# Patient Record
Sex: FEMALE | Race: WHITE | NOT HISPANIC OR LATINO | Employment: FULL TIME | URBAN - METROPOLITAN AREA
[De-identification: names, ages, dates, MRNs, and addresses within clinical notes are randomized per-mention and may not be internally consistent; named-entity substitution may affect disease eponyms.]

---

## 2023-05-12 ENCOUNTER — OFFICE VISIT (OUTPATIENT)
Age: 38
End: 2023-05-12

## 2023-05-12 VITALS
HEIGHT: 69 IN | WEIGHT: 186.29 LBS | TEMPERATURE: 98.2 F | RESPIRATION RATE: 16 BRPM | HEART RATE: 94 BPM | BODY MASS INDEX: 27.59 KG/M2 | DIASTOLIC BLOOD PRESSURE: 76 MMHG | OXYGEN SATURATION: 100 % | SYSTOLIC BLOOD PRESSURE: 142 MMHG

## 2023-05-12 DIAGNOSIS — H57.11 EYE PAIN, RIGHT: Primary | ICD-10-CM

## 2023-05-12 RX ORDER — SERTRALINE HYDROCHLORIDE 25 MG/1
TABLET, FILM COATED ORAL
COMMUNITY

## 2023-05-12 NOTE — PROGRESS NOTES
[facility]        NAME: Thee Martin is a 40 y o  female  : 1985    MRN: [id number]  DATE: May 12, 2023  TIME: 1:17 PM    Assessment and Plan   Eye pain, right [H57 11]  1  Eye pain, right              Patient Instructions     From your exam:  Your conjunctiva was not red  You did not have drainage when you woke up this morning  I did not see indication of Stye or Hordeolum from your lids  The eye exam with dye did not show corneal abrasion nor foreign body  Use Artificial tear drops OTC  Follow up with opthalmology as needed  Chief Complaint     Chief Complaint   Patient presents with   • Eye Pain     Last night - patient noticed redness, scratching feeling, and itchiness in the R eye  States she feels like she has something in it  States that the eyeball itself does not hurt but that the eyelid does  Denies specific injury  History of Present Illness       Right eye pain starting last night  Did not wake up with eye redness nor drainage  Does not remember getting any foreign body to the eye  No reported injury  States eye feels scratchy  Review of Systems   Review of Systems   HENT: Negative for congestion, sinus pressure and sinus pain  Eyes: Positive for pain  Negative for discharge, redness and itching  Neurological: Negative for dizziness, weakness, light-headedness and headaches           Current Medications       Current Outpatient Medications:   •  Cholecalciferol 10 MCG (400 UNIT) Ilya PRADO, Disp: , Rfl:   •  Multiple Vitamins-Minerals (MULTI ADULT GUMMIES PO), Take 1 tablet by mouth daily, Disp: , Rfl:   •  sertraline (ZOLOFT) 25 mg tablet, , Disp: , Rfl:     Current Allergies     Allergies as of 2023 - Reviewed 2023   Allergen Reaction Noted   • Penicillins Hives, Itching, Shortness Of Breath, and Other (See Comments) 09/10/2011            The following portions of the patient's history were reviewed and updated as appropriate: allergies, current "medications, past family history, past medical history, past social history, past surgical history and problem list      Past Medical History:   Diagnosis Date   • Anxiety    • Gestational diabetes        Past Surgical History:   Procedure Laterality Date   • WISDOM TOOTH EXTRACTION Bilateral        History reviewed  No pertinent family history  Medications have been verified  Objective   /76 (BP Location: Left arm, Patient Position: Sitting, Cuff Size: Standard)   Pulse 94   Temp 98 2 °F (36 8 °C) (Tympanic)   Resp 16   Ht 5' 8 75\" (1 746 m)   Wt 84 5 kg (186 lb 4 6 oz)   LMP 04/18/2023   SpO2 100%   BMI 27 71 kg/m²   Patient's last menstrual period was 04/18/2023  Physical Exam     Physical Exam  Vitals and nursing note reviewed  Eyes:      General: Lids are normal  Lids are everted, no foreign bodies appreciated  Vision grossly intact  Gaze aligned appropriately  No allergic shiner, visual field deficit or scleral icterus  Right eye: No foreign body, discharge or hordeolum  Left eye: No foreign body, discharge or hordeolum  Extraocular Movements: Extraocular movements intact  Right eye: No nystagmus  Left eye: No nystagmus  Conjunctiva/sclera: Conjunctivae normal       Right eye: No exudate  Left eye: No exudate  Pupils: Pupils are equal, round, and reactive to light  Right eye: No fluorescein uptake                     "

## 2023-05-12 NOTE — PATIENT INSTRUCTIONS
From your exam:  Your conjunctiva was not red  You did not have drainage when you woke up this morning  I did not see indication of Stye or Hordeolum from your lids  The eye exam with dye did not show corneal abrasion nor foreign body  Use Artificial tear drops OTC  Follow up with opthalmology as needed

## 2024-05-22 ENCOUNTER — OFFICE VISIT (OUTPATIENT)
Age: 39
End: 2024-05-22
Payer: COMMERCIAL

## 2024-05-22 VITALS
WEIGHT: 180 LBS | TEMPERATURE: 97.7 F | SYSTOLIC BLOOD PRESSURE: 122 MMHG | DIASTOLIC BLOOD PRESSURE: 80 MMHG | BODY MASS INDEX: 27.28 KG/M2 | HEIGHT: 68 IN | RESPIRATION RATE: 18 BRPM | HEART RATE: 76 BPM | OXYGEN SATURATION: 100 %

## 2024-05-22 DIAGNOSIS — N30.01 ACUTE CYSTITIS WITH HEMATURIA: Primary | ICD-10-CM

## 2024-05-22 LAB
SL AMB  POCT GLUCOSE, UA: NEGATIVE
SL AMB LEUKOCYTE ESTERASE,UA: ABNORMAL
SL AMB POCT BILIRUBIN,UA: NEGATIVE
SL AMB POCT BLOOD,UA: ABNORMAL
SL AMB POCT CLARITY,UA: ABNORMAL
SL AMB POCT COLOR,UA: ABNORMAL
SL AMB POCT KETONES,UA: NEGATIVE
SL AMB POCT NITRITE,UA: NEGATIVE
SL AMB POCT PH,UA: 6.5
SL AMB POCT SPECIFIC GRAVITY,UA: 1.01
SL AMB POCT URINE PROTEIN: NEGATIVE
SL AMB POCT UROBILINOGEN: 0.2

## 2024-05-22 PROCEDURE — 99213 OFFICE O/P EST LOW 20 MIN: CPT

## 2024-05-22 PROCEDURE — 81002 URINALYSIS NONAUTO W/O SCOPE: CPT

## 2024-05-22 PROCEDURE — 87086 URINE CULTURE/COLONY COUNT: CPT

## 2024-05-22 RX ORDER — FLUCONAZOLE 150 MG/1
150 TABLET ORAL ONCE
Qty: 1 TABLET | Refills: 0 | Status: SHIPPED | OUTPATIENT
Start: 2024-05-22 | End: 2024-05-22

## 2024-05-22 RX ORDER — NITROFURANTOIN 25; 75 MG/1; MG/1
100 CAPSULE ORAL 2 TIMES DAILY
Qty: 10 CAPSULE | Refills: 0 | Status: SHIPPED | OUTPATIENT
Start: 2024-05-22 | End: 2024-05-27

## 2024-05-22 NOTE — PATIENT INSTRUCTIONS
Your urine test in the office today shows signs of a Urinary Tract Infection  Start and complete course of antibiotics that has been sent to the pharmacy.  Your urine has been sent to culture, it will take a few days to grow.  You can monitor for results on MyChart. If you have not already done so, sign up for access to your MyChart.  If the antibiotic that was prescribed is not the optimal treatment based on the culture, you will receive a phone call only if this needs to be changed.  Increase your fluid intake.  For pain, you can take a medication called AZO but it can turn your urine orange so do not be alarmed with the color change if you take this medication.      Follow up with Primary Care Provider in 3-5 days if not improving.  Proceed to Emergency Department if symptoms worsen.    If tests have been performed at [facility], our office will contact you with results if changes need to be made to the care plan discussed with you at the visit.  You can review your full results on [facility] MyChart.

## 2024-05-22 NOTE — LETTER
May 22, 2024     Patient: Ramona Zuñiga   YOB: 1985   Date of Visit: 5/22/2024       To Whom it May Concern:    Ramona Zuñiga was seen in my clinic on 5/22/2024. Please excuse her from the morning work hours that she missed.    If you have any questions or concerns, please don't hesitate to call.         Sincerely,          ELISE Sanford        CC: No Recipients

## 2024-05-22 NOTE — PROGRESS NOTES
[facility]        NAME: Ramona Zuñiga is a 38 y.o. female  : 1985    MRN: [id number]  DATE: May 22, 2024  TIME: 9:14 AM    Assessment and Plan   Acute cystitis with hematuria [N30.01]  1. Acute cystitis with hematuria  POCT urine dip    Urine culture    Urine culture    nitrofurantoin (MACROBID) 100 mg capsule    fluconazole (DIFLUCAN) 150 mg tablet            Patient Instructions   Your urine test in the office today shows signs of a Urinary Tract Infection  Start and complete course of antibiotics that has been sent to the pharmacy.  Your urine has been sent to culture, it will take a few days to grow.  You can monitor for results on MyChart. If you have not already done so, sign up for access to your MyChart.  If the antibiotic that was prescribed is not the optimal treatment based on the culture, you will receive a phone call only if this needs to be changed.  Increase your fluid intake.  For pain, you can take a medication called AZO but it can turn your urine orange so do not be alarmed with the color change if you take this medication.      Follow up with Primary Care Provider in 3-5 days if not improving.  Proceed to Emergency Department if symptoms worsen.    If tests have been performed at [facility], our office will contact you with results if changes need to be made to the care plan discussed with you at the visit.  You can review your full [facility].    Chief Complaint     Chief Complaint   Patient presents with   • Possible UTI     Frequent urination, lower back/abdomen pain, blood in urine x 2 days. OTC - none.          History of Present Illness       Urinary frequency, lower back pain, dysuria, hematuria starting about 2 days ago.  Has held off on taking Azo due to knowing that it will discolored the urine and obscure reading on testing.        Review of Systems   Review of Systems   Constitutional:  Negative for chills and fever.   Respiratory:  Negative for  "shortness of breath.    Cardiovascular:  Negative for chest pain and palpitations.   Gastrointestinal:  Negative for abdominal pain, diarrhea, nausea and vomiting.   Genitourinary:  Positive for dysuria, frequency, hematuria and urgency.   Musculoskeletal:  Positive for back pain. Negative for arthralgias.   Skin:  Negative for color change and rash.   Neurological:  Negative for dizziness, seizures, syncope, weakness and numbness.   All other systems reviewed and are negative.        Current Medications       Current Outpatient Medications:   •  fluconazole (DIFLUCAN) 150 mg tablet, Take 1 tablet (150 mg total) by mouth once for 1 dose, Disp: 1 tablet, Rfl: 0  •  Multiple Vitamins-Minerals (MULTI ADULT GUMMIES PO), Take 1 tablet by mouth daily, Disp: , Rfl:   •  nitrofurantoin (MACROBID) 100 mg capsule, Take 1 capsule (100 mg total) by mouth 2 (two) times a day for 5 days, Disp: 10 capsule, Rfl: 0  •  sertraline (ZOLOFT) 25 mg tablet, , Disp: , Rfl:   •  Cholecalciferol 10 MCG (400 UNIT) CHEW, Chew (Patient not taking: Reported on 5/22/2024), Disp: , Rfl:     Current Allergies     Allergies as of 05/22/2024 - Reviewed 05/22/2024   Allergen Reaction Noted   • Penicillins Hives, Itching, Shortness Of Breath, and Other (See Comments) 09/10/2011            The following portions of the patient's history were reviewed and updated as appropriate: allergies, current medications, past family history, past medical history, past social history, past surgical history and problem list.     Past Medical History:   Diagnosis Date   • Anxiety    • Gestational diabetes        Past Surgical History:   Procedure Laterality Date   • WISDOM TOOTH EXTRACTION Bilateral        History reviewed. No pertinent family history.      Medications have been verified.        Objective   /80   Pulse 76   Temp 97.7 °F (36.5 °C)   Resp 18   Ht 5' 8\" (1.727 m)   Wt 81.6 kg (180 lb)   LMP 05/08/2024 (Approximate)   SpO2 100%   BMI 27.37 " kg/m²   Patient's last menstrual period was 05/08/2024 (approximate).       Physical Exam     Physical Exam  Vitals and nursing note reviewed.   Constitutional:       Appearance: Normal appearance.   HENT:      Head: Normocephalic and atraumatic.   Pulmonary:      Effort: Pulmonary effort is normal.   Abdominal:      Tenderness: There is no right CVA tenderness or left CVA tenderness.   Skin:     General: Skin is warm and dry.      Capillary Refill: Capillary refill takes less than 2 seconds.   Neurological:      General: No focal deficit present.      Mental Status: She is alert and oriented to person, place, and time. Mental status is at baseline.      Sensory: No sensory deficit.      Motor: No weakness.   Psychiatric:         Mood and Affect: Mood normal.         Behavior: Behavior normal.         Thought Content: Thought content normal.

## 2024-05-23 LAB — BACTERIA UR CULT: NORMAL

## 2024-12-26 ENCOUNTER — OFFICE VISIT (OUTPATIENT)
Age: 39
End: 2024-12-26
Payer: COMMERCIAL

## 2024-12-26 VITALS
TEMPERATURE: 98.1 F | WEIGHT: 170 LBS | HEART RATE: 69 BPM | RESPIRATION RATE: 18 BRPM | HEIGHT: 68 IN | BODY MASS INDEX: 25.76 KG/M2 | SYSTOLIC BLOOD PRESSURE: 132 MMHG | OXYGEN SATURATION: 100 % | DIASTOLIC BLOOD PRESSURE: 90 MMHG

## 2024-12-26 DIAGNOSIS — S05.01XA ABRASION OF RIGHT CORNEA, INITIAL ENCOUNTER: Primary | ICD-10-CM

## 2024-12-26 PROCEDURE — 99213 OFFICE O/P EST LOW 20 MIN: CPT

## 2024-12-26 RX ORDER — TETRACAINE HYDROCHLORIDE 5 MG/ML
2 SOLUTION OPHTHALMIC ONCE
Status: COMPLETED | OUTPATIENT
Start: 2024-12-26 | End: 2024-12-26

## 2024-12-26 RX ORDER — POLYMYXIN B SULFATE AND TRIMETHOPRIM 1; 10000 MG/ML; [USP'U]/ML
1 SOLUTION OPHTHALMIC EVERY 6 HOURS
Qty: 10 ML | Refills: 0 | Status: SHIPPED | OUTPATIENT
Start: 2024-12-26

## 2024-12-26 RX ADMIN — TETRACAINE HYDROCHLORIDE 2 DROP: 5 SOLUTION OPHTHALMIC at 17:15

## 2024-12-26 NOTE — PATIENT INSTRUCTIONS
There were multiple light abrasions to your cornea from the exam using the black light and the dye.  I have sent antibiotic eyedrops to the pharmacy as precautionary to reduce risk of infection to the eye.    Follow up with your eye doctor if not improving in 3-5 days.  Proceed to Emergency Department if symptoms worsen.    If tests have been performed at [facility], our office will contact you with results if changes need to be made to the care plan discussed with you at the visit.  You can review your full results on [facility] MyChart.

## 2024-12-26 NOTE — PROGRESS NOTES
[facility]        NAME: Ramona Zuñiga is a 39 y.o. female  : 1985    MRN: [id number]  DATE: 2024  TIME: 5:31 PM    Assessment and Plan   Abrasion of right cornea, initial encounter [S05.01XA]  1. Abrasion of right cornea, initial encounter  tetracaine 0.5 % ophthalmic solution 2 drop    fluorescein sodium sterile 1 mg ophthalmic strip 1 strip    polymyxin b-trimethoprim (POLYTRIM) ophthalmic solution            Patient Instructions   There were multiple light abrasions to your cornea from the exam using the black light and the dye.  I have sent antibiotic eyedrops to the pharmacy as precautionary to reduce risk of infection to the eye.    Follow up with your eye doctor if not improving in 3-5 days.  Proceed to Emergency Department if symptoms worsen.    If tests have been performed at [facility], our office will contact you with results if changes need to be made to the care plan discussed with you at the visit.  You can review your full results on [facility].    Chief Complaint     Chief Complaint   Patient presents with   • Eye Problem     About 45 minutes ago she was putting away her shadi tree, one of the branches hit her face. The branch also hit her right eye, causing there to now be discomfort. Pt reports feeling no pain, but feels as if there is something on her eye. After she cleaned and flushed her eye with an eyedrop, but still feels discomfort.          History of Present Illness       States she was packing the Shadi tree away when she was pushing it into the container and a branch with bristles struck her on the right side of the face and hit her right eye. She states she immediately rinsed her eye but noticed that the pain was not improving.    Eye Problem   Pertinent negatives include no eye discharge, eye redness, fever or vomiting.       Review of Systems   Review of Systems   Constitutional:  Negative for chills and fever.   HENT:  Negative for  "congestion, ear pain and sore throat.    Eyes:  Positive for pain. Negative for discharge, redness and visual disturbance.   Respiratory:  Negative for cough and shortness of breath.    Cardiovascular:  Negative for chest pain and palpitations.   Gastrointestinal:  Negative for abdominal pain and vomiting.   Skin:  Negative for color change and rash.   Neurological:  Negative for dizziness, seizures, syncope, light-headedness and headaches.   All other systems reviewed and are negative.        Current Medications       Current Outpatient Medications:   •  Multiple Vitamins-Minerals (MULTI ADULT GUMMIES PO), Take 1 tablet by mouth daily, Disp: , Rfl:   •  polymyxin b-trimethoprim (POLYTRIM) ophthalmic solution, Administer 1 drop to the right eye every 6 (six) hours For 7 days., Disp: 10 mL, Rfl: 0  •  sertraline (ZOLOFT) 25 mg tablet, , Disp: , Rfl:   •  Cholecalciferol 10 MCG (400 UNIT) CHEW, Chew (Patient not taking: Reported on 12/26/2024), Disp: , Rfl:   No current facility-administered medications for this visit.    Current Allergies     Allergies as of 12/26/2024 - Reviewed 12/26/2024   Allergen Reaction Noted   • Penicillins Hives, Itching, Shortness Of Breath, and Other (See Comments) 09/10/2011            The following portions of the patient's history were reviewed and updated as appropriate: allergies, current medications, past family history, past medical history, past social history, past surgical history and problem list.     Past Medical History:   Diagnosis Date   • Anxiety    • Gestational diabetes        Past Surgical History:   Procedure Laterality Date   • WISDOM TOOTH EXTRACTION Bilateral        History reviewed. No pertinent family history.      Medications have been verified.        Objective   /90 (BP Location: Left arm, Patient Position: Sitting, Cuff Size: Standard)   Pulse 69   Temp 98.1 °F (36.7 °C) (Tympanic)   Resp 18   Ht 5' 8\" (1.727 m)   Wt 77.1 kg (170 lb)   LMP " 12/26/2024 (Exact Date)   SpO2 100%   BMI 25.85 kg/m²   Patient's last menstrual period was 12/26/2024 (exact date).       Physical Exam     Physical Exam  Vitals and nursing note reviewed.   Constitutional:       Appearance: Normal appearance.   HENT:      Head: Normocephalic and atraumatic.   Eyes:      Pupils:      Right eye: Corneal abrasion and fluorescein uptake present.     Pulmonary:      Effort: Pulmonary effort is normal.   Skin:     General: Skin is warm and dry.      Capillary Refill: Capillary refill takes less than 2 seconds.   Neurological:      General: No focal deficit present.      Mental Status: She is alert and oriented to person, place, and time. Mental status is at baseline.      Sensory: No sensory deficit.      Motor: No weakness.   Psychiatric:         Mood and Affect: Mood normal.         Behavior: Behavior normal.         Thought Content: Thought content normal.

## 2025-01-28 ENCOUNTER — OFFICE VISIT (OUTPATIENT)
Age: 40
End: 2025-01-28
Payer: COMMERCIAL

## 2025-01-28 VITALS
TEMPERATURE: 98.9 F | BODY MASS INDEX: 27.77 KG/M2 | RESPIRATION RATE: 19 BRPM | OXYGEN SATURATION: 99 % | SYSTOLIC BLOOD PRESSURE: 126 MMHG | DIASTOLIC BLOOD PRESSURE: 82 MMHG | WEIGHT: 183.2 LBS | HEIGHT: 68 IN | HEART RATE: 71 BPM

## 2025-01-28 DIAGNOSIS — R39.9 UTI SYMPTOMS: ICD-10-CM

## 2025-01-28 DIAGNOSIS — N30.01 ACUTE CYSTITIS WITH HEMATURIA: Primary | ICD-10-CM

## 2025-01-28 LAB
SL AMB  POCT GLUCOSE, UA: NEGATIVE
SL AMB LEUKOCYTE ESTERASE,UA: ABNORMAL
SL AMB POCT BILIRUBIN,UA: NEGATIVE
SL AMB POCT BLOOD,UA: ABNORMAL
SL AMB POCT CLARITY,UA: ABNORMAL
SL AMB POCT COLOR,UA: ABNORMAL
SL AMB POCT KETONES,UA: 80
SL AMB POCT NITRITE,UA: NEGATIVE
SL AMB POCT PH,UA: 7.5
SL AMB POCT SPECIFIC GRAVITY,UA: 1.01
SL AMB POCT URINE PROTEIN: 30
SL AMB POCT UROBILINOGEN: 0.2

## 2025-01-28 PROCEDURE — 87077 CULTURE AEROBIC IDENTIFY: CPT

## 2025-01-28 PROCEDURE — G0382 LEV 3 HOSP TYPE B ED VISIT: HCPCS

## 2025-01-28 PROCEDURE — 87086 URINE CULTURE/COLONY COUNT: CPT

## 2025-01-28 PROCEDURE — 87186 SC STD MICRODIL/AGAR DIL: CPT

## 2025-01-28 PROCEDURE — 81002 URINALYSIS NONAUTO W/O SCOPE: CPT

## 2025-01-28 RX ORDER — NITROFURANTOIN 25; 75 MG/1; MG/1
100 CAPSULE ORAL 2 TIMES DAILY
Qty: 10 CAPSULE | Refills: 0 | Status: SHIPPED | OUTPATIENT
Start: 2025-01-28 | End: 2025-02-02

## 2025-01-28 NOTE — PROGRESS NOTES
[facility]        NAME: Ramona Zuñiga is a 39 y.o. female  : 1985    MRN: [id number]  DATE: 2025  TIME: 4:53 PM    Assessment and Plan   Acute cystitis with hematuria [N30.01]  1. Acute cystitis with hematuria  nitrofurantoin (MACROBID) 100 mg capsule      2. UTI symptoms  POCT urine dip    Urine culture        Labs: Large leuks, 30 protein, large blood, 80 ketones.  Urine culture sent out.    Presentation and urine dip consistent with UTI.  Prescribed course of Macrobid.  Advised symptomatic treatment.    Patient Instructions     Urine dip showed signs of potential UTI. Prescribed antibiotics, use as directed. Will send culture out to determine bacteria causing infection and susceptibility to antibiotics. May try over the counter Azo to decrease burning with urination, over the counter Tylenol or ibuprofen as directed on packaging as needed for pain. Drink plenty of fluids and follow proper urinary hygiene.    Follow up with PCP in 3-5 days.  Proceed to  ER if symptoms worsen.    If tests are performed, our office will contact you with results only if changes need to made to the care plan discussed with you at the visit. You can review your full results on [facility].    Chief Complaint     Chief Complaint   Patient presents with    Possible UTI     C/o frequent urge to urinate and burning with urination x 1 days.          History of Present Illness       Patient presents for evaluation of UTI symptoms.  Notes symptoms started yesterday and seemed to worsen today.  Admits to frequency, urgency, burning.  LMP last week (-).    Urinary Tract Infection   This is a new problem. The current episode started yesterday. The problem occurs every urination. The problem has been gradually worsening. The quality of the pain is described as burning. The pain is at a severity of 5/10. There has been no fever. She is Sexually active (denies concern for STDs). There is No history  "of pyelonephritis. Associated symptoms include frequency and urgency. Pertinent negatives include no chills, flank pain or hematuria. Treatments tried: increased water intake. The treatment provided mild relief.       Review of Systems   Review of Systems   Constitutional:  Negative for chills and fever.   Gastrointestinal:  Negative for abdominal pain.   Genitourinary:  Positive for dysuria, frequency and urgency. Negative for flank pain, hematuria, vaginal bleeding and vaginal discharge.   Musculoskeletal:  Negative for myalgias.         Current Medications       Current Outpatient Medications:     Multiple Vitamins-Minerals (MULTI ADULT GUMMIES PO), Take 1 tablet by mouth daily, Disp: , Rfl:     nitrofurantoin (MACROBID) 100 mg capsule, Take 1 capsule (100 mg total) by mouth 2 (two) times a day for 5 days, Disp: 10 capsule, Rfl: 0    sertraline (ZOLOFT) 25 mg tablet, , Disp: , Rfl:     Cholecalciferol 10 MCG (400 UNIT) CHEW, Chew (Patient not taking: Reported on 5/22/2024), Disp: , Rfl:     Current Allergies     Allergies as of 01/28/2025 - Reviewed 01/28/2025   Allergen Reaction Noted    Penicillins Hives, Itching, Shortness Of Breath, and Other (See Comments) 09/10/2011            The following portions of the patient's history were reviewed and updated as appropriate: allergies, current medications, past family history, past medical history, past social history, past surgical history and problem list.     Past Medical History:   Diagnosis Date    Anxiety     Gestational diabetes        Past Surgical History:   Procedure Laterality Date    WISDOM TOOTH EXTRACTION Bilateral        History reviewed. No pertinent family history.      Medications have been verified.        Objective   /82 (BP Location: Left arm, Patient Position: Sitting)   Pulse 71   Temp 98.9 °F (37.2 °C)   Resp 19   Ht 5' 8\" (1.727 m)   Wt 83.1 kg (183 lb 3.2 oz)   LMP 01/23/2025 (Exact Date)   SpO2 99%   BMI 27.86 kg/m²      "   Physical Exam     Physical Exam  Vitals and nursing note reviewed.   Constitutional:       General: She is not in acute distress.     Appearance: Normal appearance. She is not ill-appearing.   Cardiovascular:      Rate and Rhythm: Normal rate and regular rhythm.      Pulses: Normal pulses.      Heart sounds: Normal heart sounds.   Pulmonary:      Effort: Pulmonary effort is normal.      Breath sounds: Normal breath sounds.   Abdominal:      General: Abdomen is flat. Bowel sounds are normal. There is no distension.      Palpations: Abdomen is soft.      Tenderness: There is no abdominal tenderness. There is no right CVA tenderness, left CVA tenderness or guarding.   Neurological:      Mental Status: She is alert.

## 2025-01-28 NOTE — PATIENT INSTRUCTIONS
"Urine dip showed signs of potential UTI. Prescribed antibiotics, use as directed. Will send culture out to determine bacteria causing infection and susceptibility to antibiotics. May try over the counter Azo to decrease burning with urination, over the counter Tylenol or ibuprofen as directed on packaging as needed for pain. Drink plenty of fluids and follow proper urinary hygiene.    Follow up with PCP in 3-5 days.  Proceed to  ER if symptoms worsen.    If tests are performed, our office will contact you with results only if changes need to made to the care plan discussed with you at the visit. You can review your full results on [email or web address].    Patient Education     Urinary tract infection - Discharge instructions   The Basics   Written by the doctors and editors at [facility]   What are discharge instructions? -- Discharge instructions are information about how to take care of yourself after getting medical care for a health problem.  What is a urinary tract infection? -- A urinary tract infection (\"UTI\") is an infection that affects either the bladder or the kidneys (figure 1). A kidney infection is more serious, and can lead to other serious problems if it is not treated properly.  You need to take antibiotics to treat a UTI. It is important to take all of your antibiotics, even if you start to feel better.  How do I care for myself at home? -- Ask the doctor or nurse what you should do when you go home. Make sure that you understand exactly what you need to do to care for yourself. Ask questions if there is anything you do not understand.  You should also:   Take all of your medicines as instructed.   Take phenazopyridine (sample brand name: AZO Urinary Pain Relief) for the first day or so, if you choose. This is an over-the-counter medicine. It will help numb your bladder and decrease the urge to urinate. This medicine causes your urine and tears to look orange.   Take acetaminophen (sample brand name: " Tylenol) if needed for pain.   Drink extra fluids. This can help prevent more bladder infections. If you have sex, these things might also help:   Urinate right afterward.   If you use birth control, use a form that does not contain spermicide.  When should I call the doctor? -- Call for advice if:   You have pain in your back, shoulder, or belly.   You have a fever, shaking chills, or sweats even though you are taking antibiotics.   You notice more blood in your urine.   Your symptoms get worse or do not get better within 24 hours of starting antibiotics.   Your symptoms come back after finishing treatment.   You have any new or worrying symptoms.  All topics are updated as new evidence becomes available and our peer review process is complete.  This topic retrieved from Doc[employer] on: Feb 26, 2024.  Topic 179411 Version 1.0  Release: 32.2.4 - C32.56  © 2024 UpToDate, Inc. and/or its affiliates. All rights reserved.  figure 1: Anatomy of the urinary tract     Urine is made by the kidneys. It passes from the kidneys into the bladder through 2 tubes called the ureters. Then, it leaves the bladder through another tube called the urethra.  Graphic 03792 Version 8.0  Consumer Information Use and Disclaimer   Disclaimer: This generalized information is a limited summary of diagnosis, treatment, and/or medication information. It is not meant to be comprehensive and should be used as a tool to help the user understand and/or assess potential diagnostic and treatment options. It does NOT include all information about conditions, treatments, medications, side effects, or risks that may apply to a specific patient. It is not intended to be medical advice or a substitute for the medical advice, diagnosis, or treatment of a health care provider based on the health care provider's examination and assessment of a patient's specific and unique circumstances. Patients must speak with a health care provider for complete information  about their health, medical questions, and treatment options, including any risks or benefits regarding use of medications. This information does not endorse any treatments or medications as safe, effective, or approved for treating a specific patient. UpToDate, Inc. and its affiliates disclaim any warranty or liability relating to this information or the use thereof.The use of this information is governed by the Terms of Use, available at https://www.woltersBitMethoduwer.com/en/know/clinical-effectiveness-terms. 2024© UpToDate, Inc. and its affiliates and/or licensors. All rights reserved.  Copyright   © 2024 UpToDate, Inc. and/or its affiliates. All rights reserved.

## 2025-01-30 LAB
BACTERIA UR CULT: ABNORMAL
BACTERIA UR CULT: ABNORMAL

## 2025-02-20 ENCOUNTER — OFFICE VISIT (OUTPATIENT)
Age: 40
End: 2025-02-20
Payer: COMMERCIAL

## 2025-02-20 VITALS
DIASTOLIC BLOOD PRESSURE: 92 MMHG | OXYGEN SATURATION: 97 % | TEMPERATURE: 97.4 F | SYSTOLIC BLOOD PRESSURE: 132 MMHG | HEIGHT: 68 IN | HEART RATE: 72 BPM | RESPIRATION RATE: 18 BRPM | BODY MASS INDEX: 27.74 KG/M2 | WEIGHT: 183 LBS

## 2025-02-20 DIAGNOSIS — R35.0 URINARY FREQUENCY: Primary | ICD-10-CM

## 2025-02-20 LAB
SL AMB  POCT GLUCOSE, UA: ABNORMAL
SL AMB LEUKOCYTE ESTERASE,UA: ABNORMAL
SL AMB POCT BILIRUBIN,UA: ABNORMAL
SL AMB POCT BLOOD,UA: ABNORMAL
SL AMB POCT CLARITY,UA: CLEAR
SL AMB POCT COLOR,UA: YELLOW
SL AMB POCT KETONES,UA: ABNORMAL
SL AMB POCT NITRITE,UA: ABNORMAL
SL AMB POCT PH,UA: 5
SL AMB POCT SPECIFIC GRAVITY,UA: 1.02
SL AMB POCT URINE PROTEIN: ABNORMAL
SL AMB POCT UROBILINOGEN: 0.2

## 2025-02-20 PROCEDURE — G0382 LEV 3 HOSP TYPE B ED VISIT: HCPCS

## 2025-02-20 PROCEDURE — 87086 URINE CULTURE/COLONY COUNT: CPT

## 2025-02-20 PROCEDURE — 81002 URINALYSIS NONAUTO W/O SCOPE: CPT

## 2025-02-20 RX ORDER — ESCITALOPRAM OXALATE 10 MG/1
TABLET ORAL
COMMUNITY
Start: 2025-02-07

## 2025-02-20 NOTE — PROGRESS NOTES
[facility]        NAME: Ramona Zuñiga is a 39 y.o. female  : 1985    MRN: [id number]  DATE: 2025  TIME: 4:29 PM    Assessment and Plan   Urinary frequency [R35.0]  1. Urinary frequency  POCT urine dip    Urine culture    Urine culture        Large blood on urine dip test.  Currently having her period.    Patient Instructions   Urine dip test showed blood in the urine but no Leukocytes and other markers to strongly correlate with a Urinary Tract Infection.  Will send urine for culture and contact you if bacteria grows (will have results in about 48 hours) to indicate a UTI as the cause of your discomfort.  If bacteria grows- we will then call in treatment to the pharmacy for you.    Follow up with Primary Care Provider in 3-5 days if not improving.  Proceed to Emergency Department if symptoms worsen.    If tests have been performed at [facility], our office will contact you with results if changes need to be made to the care plan discussed with you at the visit.  You can review your full results on [facility].    Chief Complaint     Chief Complaint   Patient presents with   • Possible UTI     Treated for positive UTI on  with 5 days of macrobid. Pt states she did improve completely for a short period. Returns today with c/o urine frequency with mild burning since yesterday, does have abdominal cramp but is currently on mensus.          History of Present Illness       Patient reports mild burning when urinating starting yesterday and increased urinary frequency today.  She does report some abdominal cramping but she is also on her period currently.  She reports that she did drink a lot of fluids and does feel like she is getting better so unsure if she was able to flush out what was causing her discomfort.    Difficulty Urinating   This is a recurrent problem. The current episode started yesterday. The problem occurs intermittently. The problem has been waxing and waning.  "The quality of the pain is described as burning. The pain is at a severity of 3/10. The patient is experiencing no pain. There has been no fever. The fever has been present for Less than 1 day. She is Sexually active. There is No history of pyelonephritis. Associated symptoms include frequency and urgency. Pertinent negatives include no chills, discharge, flank pain, hematuria, hesitancy, nausea, possible pregnancy, sweats or vomiting.       Review of Systems   Review of Systems   Constitutional:  Negative for chills.   Gastrointestinal:  Negative for nausea and vomiting.   Genitourinary:  Positive for dysuria, frequency and urgency. Negative for flank pain, hematuria and hesitancy.         Current Medications       Current Outpatient Medications:   •  escitalopram (LEXAPRO) 10 mg tablet, , Disp: , Rfl:   •  Multiple Vitamins-Minerals (MULTI ADULT GUMMIES PO), Take 1 tablet by mouth daily, Disp: , Rfl:   •  Cholecalciferol 10 MCG (400 UNIT) CHEW, Chew (Patient not taking: Reported on 2/20/2025), Disp: , Rfl:   •  sertraline (ZOLOFT) 25 mg tablet, , Disp: , Rfl:     Current Allergies     Allergies as of 02/20/2025 - Reviewed 02/20/2025   Allergen Reaction Noted   • Penicillins Hives, Itching, Shortness Of Breath, and Other (See Comments) 09/10/2011            The following portions of the patient's history were reviewed and updated as appropriate: allergies, current medications, past family history, past medical history, past social history, past surgical history and problem list.     Past Medical History:   Diagnosis Date   • Anxiety    • Gestational diabetes        Past Surgical History:   Procedure Laterality Date   • WISDOM TOOTH EXTRACTION Bilateral        History reviewed. No pertinent family history.      Medications have been verified.        Objective   /92   Pulse 72   Temp (!) 97.4 °F (36.3 °C)   Resp 18   Ht 5' 8\" (1.727 m)   Wt 83 kg (183 lb)   LMP 02/19/2025 (Exact Date)   SpO2 97%   BMI " 27.83 kg/m²   Patient's last menstrual period was 02/19/2025 (exact date).      Physical Exam     Physical Exam  Vitals and nursing note reviewed.   Constitutional:       Appearance: Normal appearance.   HENT:      Head: Normocephalic and atraumatic.   Pulmonary:      Effort: Pulmonary effort is normal.   Abdominal:      Tenderness: There is no abdominal tenderness. There is no right CVA tenderness or left CVA tenderness.   Skin:     General: Skin is warm and dry.      Capillary Refill: Capillary refill takes less than 2 seconds.   Neurological:      General: No focal deficit present.      Mental Status: She is alert and oriented to person, place, and time. Mental status is at baseline.      Sensory: No sensory deficit.      Motor: No weakness.   Psychiatric:         Mood and Affect: Mood normal.         Behavior: Behavior normal.         Thought Content: Thought content normal.

## 2025-02-20 NOTE — PATIENT INSTRUCTIONS
Urine dip test showed blood in the urine but no Leukocytes and other markers to strongly correlate with a Urinary Tract Infection.  Will send urine for culture and contact you if bacteria grows (will have results in about 48 hours) to indicate a UTI as the cause of your discomfort.  If bacteria grows- we will then call in treatment to the pharmacy for you.    Follow up with Primary Care Provider in 3-5 days if not improving.  Proceed to Emergency Department if symptoms worsen.    If tests have been performed at [facility], our office will contact you with results if changes need to be made to the care plan discussed with you at the visit.  You can review your full results on [facility].

## 2025-02-22 ENCOUNTER — RESULTS FOLLOW-UP (OUTPATIENT)
Age: 40
End: 2025-02-22

## 2025-02-22 DIAGNOSIS — N30.01 ACUTE CYSTITIS WITH HEMATURIA: Primary | ICD-10-CM

## 2025-02-22 LAB — BACTERIA UR CULT: NORMAL

## 2025-02-22 RX ORDER — CIPROFLOXACIN 500 MG/1
500 TABLET, FILM COATED ORAL EVERY 12 HOURS SCHEDULED
Qty: 14 TABLET | Refills: 0 | Status: SHIPPED | OUTPATIENT
Start: 2025-02-22 | End: 2025-03-01

## 2025-06-10 ENCOUNTER — OFFICE VISIT (OUTPATIENT)
Age: 40
End: 2025-06-10
Payer: COMMERCIAL

## 2025-06-10 VITALS
OXYGEN SATURATION: 99 % | BODY MASS INDEX: 26.66 KG/M2 | SYSTOLIC BLOOD PRESSURE: 120 MMHG | WEIGHT: 180 LBS | RESPIRATION RATE: 18 BRPM | HEART RATE: 70 BPM | TEMPERATURE: 97.6 F | HEIGHT: 69 IN | DIASTOLIC BLOOD PRESSURE: 84 MMHG

## 2025-06-10 DIAGNOSIS — N30.01 ACUTE CYSTITIS WITH HEMATURIA: Primary | ICD-10-CM

## 2025-06-10 LAB
SL AMB  POCT GLUCOSE, UA: ABNORMAL
SL AMB LEUKOCYTE ESTERASE,UA: ABNORMAL
SL AMB POCT BILIRUBIN,UA: ABNORMAL
SL AMB POCT BLOOD,UA: ABNORMAL
SL AMB POCT CLARITY,UA: CLEAR
SL AMB POCT COLOR,UA: YELLOW
SL AMB POCT KETONES,UA: ABNORMAL
SL AMB POCT NITRITE,UA: ABNORMAL
SL AMB POCT PH,UA: 8.5
SL AMB POCT SPECIFIC GRAVITY,UA: 1
SL AMB POCT URINE PROTEIN: ABNORMAL
SL AMB POCT UROBILINOGEN: 0.2

## 2025-06-10 PROCEDURE — 87086 URINE CULTURE/COLONY COUNT: CPT

## 2025-06-10 PROCEDURE — G0382 LEV 3 HOSP TYPE B ED VISIT: HCPCS

## 2025-06-10 PROCEDURE — 81002 URINALYSIS NONAUTO W/O SCOPE: CPT

## 2025-06-10 RX ORDER — NITROFURANTOIN 25; 75 MG/1; MG/1
100 CAPSULE ORAL 2 TIMES DAILY
Qty: 10 CAPSULE | Refills: 0 | Status: SHIPPED | OUTPATIENT
Start: 2025-06-10 | End: 2025-06-15

## 2025-06-10 NOTE — PATIENT INSTRUCTIONS
Your urine test in the office today shows signs of a Urinary Tract Infection  Start and complete course of antibiotics that has been sent to the pharmacy.  Your urine has been sent to culture, it will take at least 48 hours to grow.  If the antibiotic that was prescribed is not the optimal treatment based on the culture, you will receive a phone call only if this needs to be changed.  Increase your fluid intake.  For pain, you can take a medication called AZO but it can turn your urine orange so do not be alarmed with the color change if you take this medication.    Follow up with Primary Care Provider in 3-5 days if not improving.  Proceed to Emergency Department if symptoms worsen.    If tests have been performed at [facility], our office will contact you with results if changes need to be made to the care plan discussed with you at the visit.  You can review your full results on [facility].

## 2025-06-10 NOTE — PROGRESS NOTES
[facility]        NAME: Ramona Zuñiga is a 39 y.o. female  : 1985    MRN: [id number]  DATE: Ela 10, 2025  TIME: 6:41 PM    Assessment and Plan   Acute cystitis with hematuria [N30.01]  1. Acute cystitis with hematuria  POCT urine dip    Urine culture    Urine culture    nitrofurantoin (MACROBID) 100 mg capsule          Patient Instructions   Your urine test in the office today shows signs of a Urinary Tract Infection  Start and complete course of antibiotics that has been sent to the pharmacy.  Your urine has been sent to culture, it will take at least 48 hours to grow.  If the antibiotic that was prescribed is not the optimal treatment based on the culture, you will receive a phone call only if this needs to be changed.  Increase your fluid intake.  For pain, you can take a medication called AZO but it can turn your urine orange so do not be alarmed with the color change if you take this medication.    Follow up with Primary Care Provider in 3-5 days if not improving.  Proceed to Emergency Department if symptoms worsen.    If tests have been performed at [facility], our office will contact you with results if changes need to be made to the care plan discussed with you at the visit.  You can review your full results [facility]    Chief Complaint     Chief Complaint   Patient presents with   • Urinary Tract Infection     She has been experiencing symptoms (frequency and burning) since yesterday afternoon. She is unsure if she is bleeding from the uti due to her starting her period.          History of Present Illness       Patient reports urinary frequency and burning starting yesterday afternoon.  She is unsure if she has any blood in her urine due to reporting that she currently has her period as well.    Urinary Tract Infection   Associated symptoms include frequency and urgency. Pertinent negatives include no chills, nausea or vomiting.       Review of Systems   Review of Systems  "  Constitutional:  Negative for chills and fever.   Respiratory:  Negative for shortness of breath.    Cardiovascular:  Negative for chest pain and palpitations.   Gastrointestinal:  Negative for abdominal pain, diarrhea, nausea and vomiting.   Genitourinary:  Positive for dysuria, frequency and urgency.   Musculoskeletal:  Negative for arthralgias and back pain.   Skin:  Negative for color change and rash.   Neurological:  Negative for dizziness, seizures, syncope, weakness and numbness.   All other systems reviewed and are negative.        Current Medications     Current Medications[1]    Current Allergies     Allergies as of 06/10/2025 - Reviewed 06/10/2025   Allergen Reaction Noted   • Penicillins Hives, Itching, Shortness Of Breath, and Other (See Comments) 09/10/2011            The following portions of the patient's history were reviewed and updated as appropriate: allergies, current medications, past family history, past medical history, past social history, past surgical history and problem list.     Past Medical History[2]    Past Surgical History[3]    Family History[4]      Medications have been verified.        Objective   /84 (BP Location: Left arm, Patient Position: Sitting, Cuff Size: Large)   Pulse 70   Temp 97.6 °F (36.4 °C) (Tympanic)   Resp 18   Ht 5' 9\" (1.753 m)   Wt 81.6 kg (180 lb)   LMP 06/10/2025 (Exact Date)   SpO2 99%   BMI 26.58 kg/m²   Patient's last menstrual period was 06/10/2025 (exact date).      Physical Exam     Physical Exam  Vitals and nursing note reviewed.   Constitutional:       Appearance: Normal appearance.   HENT:      Head: Normocephalic and atraumatic.   Pulmonary:      Effort: Pulmonary effort is normal.   Abdominal:      Tenderness: There is no abdominal tenderness. There is no right CVA tenderness or left CVA tenderness.     Skin:     General: Skin is warm and dry.      Capillary Refill: Capillary refill takes less than 2 seconds.     Neurological:      " General: No focal deficit present.      Mental Status: She is alert and oriented to person, place, and time. Mental status is at baseline.      Sensory: No sensory deficit.      Motor: No weakness.     Psychiatric:         Mood and Affect: Mood normal.         Behavior: Behavior normal.         Thought Content: Thought content normal.                          [1]    Current Outpatient Medications:   •  Multiple Vitamins-Minerals (MULTI ADULT GUMMIES PO), Take 1 tablet by mouth in the morning., Disp: , Rfl:   •  nitrofurantoin (MACROBID) 100 mg capsule, Take 1 capsule (100 mg total) by mouth 2 (two) times a day for 5 days, Disp: 10 capsule, Rfl: 0  •  Cholecalciferol 10 MCG (400 UNIT) CHEW, Chew (Patient not taking: Reported on 5/22/2024), Disp: , Rfl:   •  escitalopram (LEXAPRO) 10 mg tablet, , Disp: , Rfl:   •  sertraline (ZOLOFT) 25 mg tablet, , Disp: , Rfl: [2]  Past Medical History:  Diagnosis Date   • Anxiety    • Gestational diabetes    [3]  Past Surgical History:  Procedure Laterality Date   • WISDOM TOOTH EXTRACTION Bilateral    [4]  No family history on file.

## 2025-06-15 LAB — BACTERIA UR CULT: ABNORMAL

## 2025-06-16 ENCOUNTER — OFFICE VISIT (OUTPATIENT)
Age: 40
End: 2025-06-16
Payer: COMMERCIAL

## 2025-06-16 ENCOUNTER — TELEPHONE (OUTPATIENT)
Age: 40
End: 2025-06-16

## 2025-06-16 VITALS
HEIGHT: 69 IN | HEART RATE: 87 BPM | BODY MASS INDEX: 26.66 KG/M2 | SYSTOLIC BLOOD PRESSURE: 132 MMHG | WEIGHT: 180 LBS | TEMPERATURE: 97.3 F | RESPIRATION RATE: 18 BRPM | DIASTOLIC BLOOD PRESSURE: 82 MMHG | OXYGEN SATURATION: 99 %

## 2025-06-16 DIAGNOSIS — R39.9 UTI SYMPTOMS: Primary | ICD-10-CM

## 2025-06-16 LAB
SL AMB  POCT GLUCOSE, UA: NEGATIVE
SL AMB LEUKOCYTE ESTERASE,UA: ABNORMAL
SL AMB POCT BILIRUBIN,UA: NEGATIVE
SL AMB POCT BLOOD,UA: NEGATIVE
SL AMB POCT CLARITY,UA: CLEAR
SL AMB POCT COLOR,UA: YELLOW
SL AMB POCT KETONES,UA: NEGATIVE
SL AMB POCT NITRITE,UA: NEGATIVE
SL AMB POCT PH,UA: 6.5
SL AMB POCT SPECIFIC GRAVITY,UA: 1.01
SL AMB POCT URINE PROTEIN: ABNORMAL
SL AMB POCT UROBILINOGEN: 0.2

## 2025-06-16 PROCEDURE — G0382 LEV 3 HOSP TYPE B ED VISIT: HCPCS | Performed by: NURSE PRACTITIONER

## 2025-06-16 PROCEDURE — 81002 URINALYSIS NONAUTO W/O SCOPE: CPT | Performed by: NURSE PRACTITIONER

## 2025-06-16 PROCEDURE — 87086 URINE CULTURE/COLONY COUNT: CPT | Performed by: NURSE PRACTITIONER

## 2025-06-16 NOTE — TELEPHONE ENCOUNTER
Returned a phone called to Ramona regarding her concerns for recent visit for UTI. She informed me that she did finish a complete course of abx for UTI but is concerned after reading online that the abx she was prescribed may have not been the best abx for treatment for what was found in the urine culture. She states that she is feeling much better today but is especially concerned because she is going away on vacation this coming weekend. Pt reassured that it is a good sign that she is improving and unfortunately the phone call lost connection as I was asking what symptoms, if any, still present.

## 2025-06-16 NOTE — PROGRESS NOTES
[facility]        NAME: Ramona Zuñiga is a 39 y.o. female  : 1985    MRN: [id number]  DATE: 2025  TIME: 8:02 PM    Assessment and Plan   UTI symptoms [R39.9]  1. UTI symptoms  POCT urine dip    Urine culture    Urine culture        UA with leuks, will send for culture and start treatment if needed. Patient agreeable to plan of care.    Patient Instructions       Follow up with PCP in 3-5 days.  Proceed to  ER if symptoms worsen.    If tests have been performed at [facility], our office will contact you with results if changes need to be made to the care plan discussed with you at the visit.  You can review your full results on [facility] My[facility].    Chief Complaint     Chief Complaint   Patient presents with   • Possible UTI     Recently treated for uti last Tuesday finished abx, still having pressure but is improving. Going away on vacation this weekend and wants to be sure the UTI is clearing up.          History of Present Illness       Patient seen 6 days ago for UTI symptoms, was treated with Macrobid, culture showed 10,000-19,000 cfu/ml Alpha Hemolytic Streptococcus NOT Enterococcus   Patient reports symptoms are largely resolved but continues with suprapubic pressure that is also improving  Patient is leaving for vacation in a few days and wants to make sure she will be ok for vacation  Finished abx yesterday           Review of Systems   Review of Systems   Gastrointestinal:  Positive for abdominal pain (suprapubic pressure).   All other systems reviewed and are negative.        Current Medications     Current Medications[1]    Current Allergies     Allergies as of 2025 - Reviewed 2025   Allergen Reaction Noted   • Penicillins Hives, Itching, Shortness Of Breath, and Other (See Comments) 09/10/2011            The following portions of the patient's history were reviewed and updated as appropriate: allergies, current medications, past family history, past medical  "history, past social history, past surgical history and problem list.     Past Medical History[2]    Past Surgical History[3]    Family History[4]      Medications have been verified.        Objective   /82 (Patient Position: Sitting)   Pulse 87   Temp (!) 97.3 °F (36.3 °C) (Tympanic)   Resp 18   Ht 5' 9\" (1.753 m)   Wt 81.6 kg (180 lb)   LMP 06/10/2025 (Exact Date)   SpO2 99%   BMI 26.58 kg/m²   Patient's last menstrual period was 06/10/2025 (exact date).       Physical Exam     Physical Exam  Constitutional:       Appearance: Normal appearance.   HENT:      Head: Normocephalic and atraumatic.     Cardiovascular:      Rate and Rhythm: Normal rate.   Pulmonary:      Effort: Pulmonary effort is normal.   Abdominal:      General: Abdomen is flat. Bowel sounds are normal.      Palpations: Abdomen is soft.      Tenderness: There is abdominal tenderness (mild discomfort) in the suprapubic area. There is no right CVA tenderness or left CVA tenderness.     Musculoskeletal:         General: Normal range of motion.     Skin:     General: Skin is warm and dry.     Neurological:      Mental Status: She is alert and oriented to person, place, and time.                          [1]    Current Outpatient Medications:   •  Multiple Vitamins-Minerals (MULTI ADULT GUMMIES PO), Take 1 tablet by mouth in the morning., Disp: , Rfl:   •  Cholecalciferol 10 MCG (400 UNIT) CHEW, Chew (Patient not taking: Reported on 5/22/2024), Disp: , Rfl:   •  escitalopram (LEXAPRO) 10 mg tablet, , Disp: , Rfl:   •  sertraline (ZOLOFT) 25 mg tablet, , Disp: , Rfl: [2]  Past Medical History:  Diagnosis Date   • Anxiety    • Gestational diabetes    [3]  Past Surgical History:  Procedure Laterality Date   • WISDOM TOOTH EXTRACTION Bilateral    [4]  Family History  Problem Relation Name Age of Onset   • No Known Problems Mother     • No Known Problems Father     "

## 2025-06-17 NOTE — PATIENT INSTRUCTIONS
Can use Pyridium (over the counter) for help with suprapubic pressure, this will turn your urine orange  Increased hydration  If worsening symptoms, such as blood in urine, back or flank pain, fevers, vomiting, please go to the Emergency Room  We will call you in 1-3 days with results of your urine culture only if your treatment needs to be started

## 2025-06-18 LAB — BACTERIA UR CULT: NORMAL

## 2025-06-19 ENCOUNTER — RESULTS FOLLOW-UP (OUTPATIENT)
Age: 40
End: 2025-06-19